# Patient Record
Sex: FEMALE | Race: BLACK OR AFRICAN AMERICAN | NOT HISPANIC OR LATINO
[De-identification: names, ages, dates, MRNs, and addresses within clinical notes are randomized per-mention and may not be internally consistent; named-entity substitution may affect disease eponyms.]

---

## 2017-11-02 PROBLEM — Z00.00 ENCOUNTER FOR PREVENTIVE HEALTH EXAMINATION: Status: ACTIVE | Noted: 2017-11-02

## 2017-11-16 ENCOUNTER — APPOINTMENT (OUTPATIENT)
Dept: ANTEPARTUM | Facility: CLINIC | Age: 22
End: 2017-11-16
Payer: COMMERCIAL

## 2017-11-16 ENCOUNTER — ASOB RESULT (OUTPATIENT)
Age: 22
End: 2017-11-16

## 2017-11-16 PROCEDURE — 76813 OB US NUCHAL MEAS 1 GEST: CPT

## 2017-11-16 PROCEDURE — 76801 OB US < 14 WKS SINGLE FETUS: CPT

## 2017-11-16 PROCEDURE — 36416 COLLJ CAPILLARY BLOOD SPEC: CPT

## 2018-01-02 ENCOUNTER — APPOINTMENT (OUTPATIENT)
Dept: ANTEPARTUM | Facility: CLINIC | Age: 23
End: 2018-01-02

## 2019-01-30 ENCOUNTER — EMERGENCY (EMERGENCY)
Facility: HOSPITAL | Age: 24
LOS: 1 days | Discharge: HOME | End: 2019-01-30
Attending: EMERGENCY MEDICINE
Payer: COMMERCIAL

## 2019-01-30 VITALS
OXYGEN SATURATION: 100 % | DIASTOLIC BLOOD PRESSURE: 82 MMHG | SYSTOLIC BLOOD PRESSURE: 136 MMHG | HEIGHT: 65 IN | WEIGHT: 229.94 LBS | HEART RATE: 73 BPM | RESPIRATION RATE: 15 BRPM | TEMPERATURE: 98 F

## 2019-01-30 VITALS
DIASTOLIC BLOOD PRESSURE: 80 MMHG | OXYGEN SATURATION: 100 % | RESPIRATION RATE: 18 BRPM | TEMPERATURE: 98 F | SYSTOLIC BLOOD PRESSURE: 135 MMHG | HEART RATE: 77 BPM

## 2019-01-30 PROCEDURE — 99284 EMERGENCY DEPT VISIT MOD MDM: CPT

## 2019-01-30 PROCEDURE — 70450 CT HEAD/BRAIN W/O DYE: CPT

## 2019-01-30 PROCEDURE — 99284 EMERGENCY DEPT VISIT MOD MDM: CPT | Mod: 25

## 2019-01-30 PROCEDURE — 70450 CT HEAD/BRAIN W/O DYE: CPT | Mod: 26

## 2019-01-30 NOTE — ED PROVIDER NOTE - OBJECTIVE STATEMENT
23F with no significant Hx, family Hx of aneurysms (paternal grandmother, maternal grandfather, aunt), presents to the ED with complaints of x1 week of acute onset headache, associated with orbital pain, and neck pain. Pt was seen at urgent Care yesterday and was directed to the ED for further work up. Pt has been treating her symptoms with ibuprofen x4 this morning and x2 400mg x6 hours PTA. Denies Hx of CT, blurred vision, weakness, numbness, or any other complaints at this time. States that pain has currently subsided with ibuprofen, with the exception of her orbital pain.

## 2019-01-30 NOTE — ED PROVIDER NOTE - MEDICAL DECISION MAKING DETAILS
23F with complaints of headache. Will obtain CT and reassess. 23F with complaints of headache. No headache currently during physical exam. Will obtain CT and reassess.

## 2019-01-30 NOTE — ED ADULT NURSE NOTE - OBJECTIVE STATEMENT
22 yo F no pmh came to ED c/o ha x 1 week.  Denies CP, back pain, SOB, fevers/chills, numbness, tingling, lightheadedness, dizziness, changes in vision, or neurological symptoms, changes in urinary or bowel habits.  A&Ox4, gross neuro intact, steady gait noted.  VSS.  skin w/d/i.  Safety and comfort maintained. Will continue to monitor.

## 2019-01-30 NOTE — ED ADULT NURSE NOTE - CHPI ED NUR SYMPTOMS NEG
no confusion/no loss of consciousness/no nausea/no numbness/no dizziness/no fever/no vomiting/no weakness/no blurred vision/no change in level of consciousness

## 2019-10-11 ENCOUNTER — EMERGENCY (EMERGENCY)
Facility: HOSPITAL | Age: 24
LOS: 1 days | Discharge: HOME | End: 2019-10-11
Attending: STUDENT IN AN ORGANIZED HEALTH CARE EDUCATION/TRAINING PROGRAM
Payer: COMMERCIAL

## 2019-10-11 VITALS
SYSTOLIC BLOOD PRESSURE: 117 MMHG | OXYGEN SATURATION: 100 % | DIASTOLIC BLOOD PRESSURE: 79 MMHG | RESPIRATION RATE: 18 BRPM | TEMPERATURE: 99 F | HEART RATE: 78 BPM | HEIGHT: 65 IN | WEIGHT: 218.04 LBS

## 2019-10-11 PROCEDURE — 99283 EMERGENCY DEPT VISIT LOW MDM: CPT

## 2019-10-12 PROCEDURE — 81025 URINE PREGNANCY TEST: CPT

## 2019-10-12 PROCEDURE — 99283 EMERGENCY DEPT VISIT LOW MDM: CPT | Mod: 25

## 2019-10-12 PROCEDURE — 96372 THER/PROPH/DIAG INJ SC/IM: CPT

## 2019-10-12 PROCEDURE — 81001 URINALYSIS AUTO W/SCOPE: CPT

## 2019-10-12 RX ORDER — DIAZEPAM 5 MG
1 TABLET ORAL
Qty: 9 | Refills: 0
Start: 2019-10-12 | End: 2019-10-14

## 2019-10-12 RX ORDER — ACETAMINOPHEN 500 MG
975 TABLET ORAL ONCE
Refills: 0 | Status: COMPLETED | OUTPATIENT
Start: 2019-10-12 | End: 2019-10-12

## 2019-10-12 RX ORDER — DIAZEPAM 5 MG
5 TABLET ORAL ONCE
Refills: 0 | Status: DISCONTINUED | OUTPATIENT
Start: 2019-10-12 | End: 2019-10-12

## 2019-10-12 RX ORDER — KETOROLAC TROMETHAMINE 30 MG/ML
30 SYRINGE (ML) INJECTION ONCE
Refills: 0 | Status: DISCONTINUED | OUTPATIENT
Start: 2019-10-12 | End: 2019-10-12

## 2019-10-12 RX ORDER — LIDOCAINE 4 G/100G
1 CREAM TOPICAL ONCE
Refills: 0 | Status: COMPLETED | OUTPATIENT
Start: 2019-10-12 | End: 2019-10-12

## 2019-10-12 RX ADMIN — LIDOCAINE 1 PATCH: 4 CREAM TOPICAL at 00:57

## 2019-10-12 RX ADMIN — Medication 30 MILLIGRAM(S): at 00:57

## 2019-10-12 RX ADMIN — Medication 5 MILLIGRAM(S): at 00:57

## 2019-10-12 RX ADMIN — Medication 975 MILLIGRAM(S): at 00:57

## 2019-10-12 NOTE — ED PROVIDER NOTE - CLINICAL SUMMARY MEDICAL DECISION MAKING FREE TEXT BOX
Shanda Nails): 25 y/o F with history of chronic back pain presents with acute on chronic back pain with associated numbness down L left. No red flags (no bladder incontinence, no bowel incontinence, no fever, no chills) to suggest any cord problems. Suspect pain is all musculoskeletal. Plan for pain control and outpatient spine follow up.

## 2019-10-12 NOTE — ED ADULT NURSE NOTE - OBJECTIVE STATEMENT
24 YOF A&Ox3 with pmh of chronic back pain presents to ED for back pain radiating to left leg. pt rates pain 3/10 to center of lower back. pt denies sob, chest pain, n/v/d, headaches, n/v/d, headaches, numbness/tingling, blurry vision. safety maintained.

## 2019-10-12 NOTE — ED PROVIDER NOTE - NS_ ATTENDINGSCRIBEDETAILS _ED_A_ED_FT
Shanda Nails M.D.: The history, relevant review of systems, past medical and surgical history, medical decision making, and physical examination was documented by the scribe in my presence and I attest to the accuracy of the documentation .

## 2019-10-12 NOTE — ED ADULT NURSE NOTE - NSIMPLEMENTINTERV_GEN_ALL_ED
Implemented All Universal Safety Interventions:  [place] to call system. Call bell, personal items and telephone within reach. Instruct patient to call for assistance. Room bathroom lighting operational. Non-slip footwear when patient is off stretcher. Physically safe environment: no spills, clutter or unnecessary equipment. Stretcher in lowest position, wheels locked, appropriate side rails in place.

## 2019-10-12 NOTE — ED PROVIDER NOTE - PATIENT PORTAL LINK FT
You can access the FollowMyHealth Patient Portal offered by [facility] by registering at the following website: [email or web address]. By joining [facility]’s FollowMyHealth portal, you will also be able to view your health information using other applications (apps) compatible with our system.

## 2019-10-12 NOTE — ED PROVIDER NOTE - OBJECTIVE STATEMENT
23 y/o F with no significant pmHx and psHx of , knee surgery, and hernia repair presents to the ED c/o worsening back pain x1 week. Pt has chronic back pain with tingling down L leg since she was 19 y/o but symptoms have exacerbated. Denies numbness in the groin, bowel incontinence, and urinary incontinence. Pt took 5 dosages of Excedrin 15 hours ago because pain was unbearable from standing at work. Pt has gone to ED several times in the past for similar symptoms where she received muscle relaxers and ibuprofen without any improvement. Pt has seen a chiropractor and consulted her PCP. Denies seeing a spine doctor or neurologist. Received a MRI in the past but does not remember the results.

## 2019-10-12 NOTE — ED ADULT NURSE NOTE - CHPI ED NUR SYMPTOMS NEG
no fatigue/no motor function loss/no bladder dysfunction/no tingling/no difficulty bearing weight/no numbness/no anorexia/no bowel dysfunction/no neck tenderness/no constipation

## 2019-10-12 NOTE — ED PROVIDER NOTE - MUSCULOSKELETAL MINIMAL EXAM
Left lower thoracic and lumbar paraspinal tenderness. Positive straight leg raise on L. No midline/spinal TTP. No flank pain. 2+ pulses.

## 2019-10-13 DIAGNOSIS — Z98.891 HISTORY OF UTERINE SCAR FROM PREVIOUS SURGERY: Chronic | ICD-10-CM

## 2019-10-13 DIAGNOSIS — Z98.890 OTHER SPECIFIED POSTPROCEDURAL STATES: Chronic | ICD-10-CM

## 2019-10-31 ENCOUNTER — APPOINTMENT (OUTPATIENT)
Dept: SPINE | Facility: CLINIC | Age: 24
End: 2019-10-31
Payer: COMMERCIAL

## 2019-10-31 VITALS
BODY MASS INDEX: 36.32 KG/M2 | SYSTOLIC BLOOD PRESSURE: 128 MMHG | HEART RATE: 101 BPM | WEIGHT: 218 LBS | DIASTOLIC BLOOD PRESSURE: 81 MMHG | HEIGHT: 65 IN

## 2019-10-31 DIAGNOSIS — Z78.9 OTHER SPECIFIED HEALTH STATUS: ICD-10-CM

## 2019-10-31 DIAGNOSIS — Z83.3 FAMILY HISTORY OF DIABETES MELLITUS: ICD-10-CM

## 2019-10-31 PROCEDURE — 99203 OFFICE O/P NEW LOW 30 MIN: CPT

## 2019-10-31 RX ORDER — CIDER VINEGAR 300 MG
TABLET ORAL
Refills: 0 | Status: ACTIVE | COMMUNITY

## 2019-10-31 NOTE — ASSESSMENT
[FreeTextEntry1] : MRI of lumbar spine\par Xrays of lumbar region and AP and lateral views\par Scoliosiis xray \par PT

## 2019-10-31 NOTE — HISTORY OF PRESENT ILLNESS
[FreeTextEntry1] : Lumbar radiculopathy  [de-identified] : 24 year old female who presents with a lower back pain for six years that is progressing.  Her pain is 4/10.  the pain radiates into the middle of her legs to the knee and at times goes into the foot and toes..  She has taken oral steroids which was effective for a short period of time.  PT was completed a few years ago. she does not describe any weakness or incontinence. There are no symptoms in the upper extremities. She has not had any imaging studies. She does go to the gym and exercise on regular basis.

## 2019-10-31 NOTE — REASON FOR VISIT
[New Patient Visit] : a new patient visit [Other: _____] : [unfilled] [Referred By: _________] : Patient was referred by REBECCA [FreeTextEntry1] : Lumbar Radiculopathy

## 2019-10-31 NOTE — PHYSICAL EXAM
[Person] : oriented to person [Place] : oriented to place [Time] : oriented to time [Short Term Intact] : short term memory intact [Remote Intact] : remote memory intact [Span Intact] : the attention span was normal [Concentration Intact] : normal concentrating ability [Fluency] : fluency intact [Comprehension] : comprehension intact [Current Events] : adequate knowledge of current events [Past History] : adequate knowledge of personal past history [Vocabulary] : adequate range of vocabulary [Cranial Nerves Optic (II)] : visual acuity intact bilaterally,  pupils equal round and reactive to light [Cranial Nerves Oculomotor (III)] : extraocular motion intact [Cranial Nerves Trigeminal (V)] : facial sensation intact symmetrically [Cranial Nerves Facial (VII)] : face symmetrical [Cranial Nerves Vestibulocochlear (VIII)] : hearing was intact bilaterally [Cranial Nerves Glossopharyngeal (IX)] : tongue and palate midline [Cranial Nerves Accessory (XI - Cranial And Spinal)] : head turning and shoulder shrug symmetric [Cranial Nerves Hypoglossal (XII)] : there was no tongue deviation with protrusion [Motor Tone] : muscle tone was normal in all four extremities [Motor Strength] : muscle strength was normal in all four extremities [No Muscle Atrophy] : normal bulk in all four extremities [Sensation Tactile Decrease] : light touch was intact [Abnormal Walk] : normal gait [Balance] : balance was intact [Past-pointing] : there was no past-pointing [Tremor] : no tremor present [2+] : Ankle jerk left 2+ [Plantar Reflex Right Only] : normal on the right [Plantar Reflex Left Only] : normal on the left [Gomez] : Gomez's sign was not demonstrated [No Tenderness to Palpation] : no spine tenderness on palpation [Full ROM] : full ROM [Straight-Leg Raise Test - Left] : straight leg raise of the left leg was negative [Straight-Leg Raise Test - Right] : straight leg raise  of the right leg was negative [Able to toe walk] : the patient was able to toe walk [Able to heel walk] : the patient was able to heel walk

## 2019-11-19 ENCOUNTER — OTHER (OUTPATIENT)
Age: 24
End: 2019-11-19

## 2019-12-12 ENCOUNTER — APPOINTMENT (OUTPATIENT)
Dept: SPINE | Facility: CLINIC | Age: 24
End: 2019-12-12

## 2019-12-13 ENCOUNTER — APPOINTMENT (OUTPATIENT)
Dept: RADIOLOGY | Facility: CLINIC | Age: 24
End: 2019-12-13
Payer: COMMERCIAL

## 2019-12-13 ENCOUNTER — OUTPATIENT (OUTPATIENT)
Dept: OUTPATIENT SERVICES | Facility: HOSPITAL | Age: 24
LOS: 1 days | End: 2019-12-13
Payer: COMMERCIAL

## 2019-12-13 ENCOUNTER — APPOINTMENT (OUTPATIENT)
Dept: MRI IMAGING | Facility: CLINIC | Age: 24
End: 2019-12-13
Payer: COMMERCIAL

## 2019-12-13 ENCOUNTER — APPOINTMENT (OUTPATIENT)
Dept: RADIOLOGY | Facility: IMAGING CENTER | Age: 24
End: 2019-12-13

## 2019-12-13 DIAGNOSIS — Z98.890 OTHER SPECIFIED POSTPROCEDURAL STATES: Chronic | ICD-10-CM

## 2019-12-13 DIAGNOSIS — M54.5 LOW BACK PAIN: ICD-10-CM

## 2019-12-13 DIAGNOSIS — Z98.891 HISTORY OF UTERINE SCAR FROM PREVIOUS SURGERY: Chronic | ICD-10-CM

## 2019-12-13 PROCEDURE — 72082 X-RAY EXAM ENTIRE SPI 2/3 VW: CPT

## 2019-12-13 PROCEDURE — 72084 X-RAY EXAM ENTIRE SPI 6/> VW: CPT | Mod: 26

## 2019-12-13 PROCEDURE — 72084 X-RAY EXAM ENTIRE SPI 6/> VW: CPT

## 2019-12-13 PROCEDURE — 72148 MRI LUMBAR SPINE W/O DYE: CPT | Mod: 26

## 2019-12-13 PROCEDURE — 72148 MRI LUMBAR SPINE W/O DYE: CPT

## 2019-12-13 PROCEDURE — 72110 X-RAY EXAM L-2 SPINE 4/>VWS: CPT

## 2020-01-09 ENCOUNTER — APPOINTMENT (OUTPATIENT)
Dept: SPINE | Facility: CLINIC | Age: 25
End: 2020-01-09
Payer: COMMERCIAL

## 2020-01-09 VITALS
HEIGHT: 65 IN | BODY MASS INDEX: 36.32 KG/M2 | DIASTOLIC BLOOD PRESSURE: 79 MMHG | WEIGHT: 218 LBS | HEART RATE: 88 BPM | OXYGEN SATURATION: 98 % | SYSTOLIC BLOOD PRESSURE: 110 MMHG | RESPIRATION RATE: 18 BRPM

## 2020-01-09 DIAGNOSIS — Z78.9 OTHER SPECIFIED HEALTH STATUS: ICD-10-CM

## 2020-01-09 DIAGNOSIS — M43.16 SPONDYLOLISTHESIS, LUMBAR REGION: ICD-10-CM

## 2020-01-09 DIAGNOSIS — M54.5 LOW BACK PAIN: ICD-10-CM

## 2020-01-09 PROCEDURE — 99213 OFFICE O/P EST LOW 20 MIN: CPT

## 2020-01-09 NOTE — PHYSICAL EXAM
[Motor Strength] : muscle strength was normal in all four extremities [Sensation Tactile Decrease] : light touch was intact

## 2020-01-31 NOTE — REASON FOR VISIT
[Follow-Up: _____] : a [unfilled] follow-up visit [Other: _____] : [unfilled] [FreeTextEntry1] : 24 year old female with a 6 year history of lumbar radiculopathy of bilateral legs.  She has difficulty walking distances and could not walk a mile on vacation.  She has never had surgery on her lower spine with chronic back pain.  Both legs have tingling and numbness into the foot and all toes bilaterally.  She brings radiographic images for review today.  No urine or bowel incontinence.  Today the pain is mild at 1/10.  Her back pain is significantly affecting her lifestyle. Flexion extension x-rays show significant L4-5 with dynamic instability up to grade 2 spondylolisthesis. There is a transitional lumbosacral junction with a rudintery L5-S1 disc space. There is spondylolysis at L4-5 seen on MRI scan and plain x-ray.

## 2020-01-31 NOTE — DATA REVIEWED
[de-identified] : Scoliosis at [facility] form 12/13/2019, and lumbar xrays from [facility] 12/13/2019 [de-identified] : MRI of lower spine 12/13/2019 from [facility]

## 2020-01-31 NOTE — REVIEW OF SYSTEMS
[Numbness] : numbness [Difficulty Walking] : difficulty walking [Tingling] : tingling [Limping] : limping [Negative] : Heme/Lymph [de-identified] : lower back pain into bilateral legs

## 2020-03-12 ENCOUNTER — FORM ENCOUNTER (OUTPATIENT)
Age: 25
End: 2020-03-12

## 2020-03-13 ENCOUNTER — OUTPATIENT (OUTPATIENT)
Dept: OUTPATIENT SERVICES | Facility: HOSPITAL | Age: 25
LOS: 1 days | End: 2020-03-13
Payer: COMMERCIAL

## 2020-03-13 VITALS
SYSTOLIC BLOOD PRESSURE: 116 MMHG | DIASTOLIC BLOOD PRESSURE: 83 MMHG | TEMPERATURE: 99 F | RESPIRATION RATE: 14 BRPM | HEIGHT: 65 IN | OXYGEN SATURATION: 98 % | WEIGHT: 227.08 LBS | HEART RATE: 92 BPM

## 2020-03-13 DIAGNOSIS — Z01.818 ENCOUNTER FOR OTHER PREPROCEDURAL EXAMINATION: ICD-10-CM

## 2020-03-13 DIAGNOSIS — M54.16 RADICULOPATHY, LUMBAR REGION: ICD-10-CM

## 2020-03-13 DIAGNOSIS — Z98.891 HISTORY OF UTERINE SCAR FROM PREVIOUS SURGERY: Chronic | ICD-10-CM

## 2020-03-13 DIAGNOSIS — M54.5 LOW BACK PAIN: ICD-10-CM

## 2020-03-13 DIAGNOSIS — Z90.89 ACQUIRED ABSENCE OF OTHER ORGANS: Chronic | ICD-10-CM

## 2020-03-13 DIAGNOSIS — Z29.9 ENCOUNTER FOR PROPHYLACTIC MEASURES, UNSPECIFIED: ICD-10-CM

## 2020-03-13 DIAGNOSIS — Z98.890 OTHER SPECIFIED POSTPROCEDURAL STATES: Chronic | ICD-10-CM

## 2020-03-13 DIAGNOSIS — M43.16 SPONDYLOLISTHESIS, LUMBAR REGION: ICD-10-CM

## 2020-03-13 LAB
ANION GAP SERPL CALC-SCNC: 14 MMOL/L — SIGNIFICANT CHANGE UP (ref 5–17)
BUN SERPL-MCNC: 11 MG/DL — SIGNIFICANT CHANGE UP (ref 7–23)
CALCIUM SERPL-MCNC: 9.2 MG/DL — SIGNIFICANT CHANGE UP (ref 8.4–10.5)
CHLORIDE SERPL-SCNC: 105 MMOL/L — SIGNIFICANT CHANGE UP (ref 96–108)
CO2 SERPL-SCNC: 20 MMOL/L — LOW (ref 22–31)
CREAT SERPL-MCNC: 0.64 MG/DL — SIGNIFICANT CHANGE UP (ref 0.5–1.3)
GLUCOSE SERPL-MCNC: 92 MG/DL — SIGNIFICANT CHANGE UP (ref 70–99)
HCT VFR BLD CALC: 36.1 % — SIGNIFICANT CHANGE UP (ref 34.5–45)
HGB BLD-MCNC: 12.2 G/DL — SIGNIFICANT CHANGE UP (ref 11.5–15.5)
MCHC RBC-ENTMCNC: 31 PG — SIGNIFICANT CHANGE UP (ref 27–34)
MCHC RBC-ENTMCNC: 33.8 GM/DL — SIGNIFICANT CHANGE UP (ref 32–36)
MCV RBC AUTO: 91.6 FL — SIGNIFICANT CHANGE UP (ref 80–100)
NRBC # BLD: 0 /100 WBCS — SIGNIFICANT CHANGE UP (ref 0–0)
PLATELET # BLD AUTO: 274 K/UL — SIGNIFICANT CHANGE UP (ref 150–400)
POTASSIUM SERPL-MCNC: 3.4 MMOL/L — LOW (ref 3.5–5.3)
POTASSIUM SERPL-SCNC: 3.4 MMOL/L — LOW (ref 3.5–5.3)
RBC # BLD: 3.94 M/UL — SIGNIFICANT CHANGE UP (ref 3.8–5.2)
RBC # FLD: 12.6 % — SIGNIFICANT CHANGE UP (ref 10.3–14.5)
RH IG SCN BLD-IMP: POSITIVE — SIGNIFICANT CHANGE UP
SODIUM SERPL-SCNC: 139 MMOL/L — SIGNIFICANT CHANGE UP (ref 135–145)
WBC # BLD: 6.54 K/UL — SIGNIFICANT CHANGE UP (ref 3.8–10.5)
WBC # FLD AUTO: 6.54 K/UL — SIGNIFICANT CHANGE UP (ref 3.8–10.5)

## 2020-03-13 PROCEDURE — 86880 COOMBS TEST DIRECT: CPT

## 2020-03-13 PROCEDURE — 86901 BLOOD TYPING SEROLOGIC RH(D): CPT

## 2020-03-13 PROCEDURE — 86850 RBC ANTIBODY SCREEN: CPT

## 2020-03-13 PROCEDURE — 72131 CT LUMBAR SPINE W/O DYE: CPT | Mod: 26

## 2020-03-13 PROCEDURE — 87640 STAPH A DNA AMP PROBE: CPT

## 2020-03-13 PROCEDURE — 80048 BASIC METABOLIC PNL TOTAL CA: CPT

## 2020-03-13 PROCEDURE — 86905 BLOOD TYPING RBC ANTIGENS: CPT

## 2020-03-13 PROCEDURE — 86900 BLOOD TYPING SEROLOGIC ABO: CPT

## 2020-03-13 PROCEDURE — 72131 CT LUMBAR SPINE W/O DYE: CPT

## 2020-03-13 PROCEDURE — 87641 MR-STAPH DNA AMP PROBE: CPT

## 2020-03-13 PROCEDURE — 86870 RBC ANTIBODY IDENTIFICATION: CPT

## 2020-03-13 PROCEDURE — 85027 COMPLETE CBC AUTOMATED: CPT

## 2020-03-13 PROCEDURE — G0463: CPT

## 2020-03-13 RX ORDER — IBUPROFEN 200 MG
6 TABLET ORAL
Qty: 0 | Refills: 0 | DISCHARGE

## 2020-03-13 NOTE — H&P PST ADULT - NSICDXPASTMEDICALHX_GEN_ALL_CORE_FT
PAST MEDICAL HISTORY:  No pertinent past medical history PAST MEDICAL HISTORY:  Lumbar radiculopathy

## 2020-03-13 NOTE — H&P PST ADULT - HISTORY OF PRESENT ILLNESS
23 y/o female with no significant medical history 23 y/o female with no significant medical history, c/o lumbar radiculopathy of bilateral legs, progressively worsening over the past year. Pt reports having difficulty ambulating long distances with intermittent numbness and tingling. Diagnostic imaging revealed mild central canal stenosis, moderate lumbar disc protrusion, severe bilateral foraminal narrowing. Evaluated by Dr. Navarro. Presents to [facility] for a scheduled L3-5 posterior lumbar fusion on 3/24/2020.

## 2020-03-13 NOTE — H&P PST ADULT - NSANTHOSAYNRD_GEN_A_CORE
No. JENIFER screening performed.  STOP BANG Legend: 0-2 = LOW Risk; 3-4 = INTERMEDIATE Risk; 5-8 = HIGH Risk/neck 15"

## 2020-03-13 NOTE — H&P PST ADULT - ACTIVITY
ADLs, limited 2/2 lower back pain and leg weakness ADLs, light housework, able to climb steps slowly, limited 2/2 lower back pain and leg weakness

## 2020-03-13 NOTE — H&P PST ADULT - MARITAL STATUS
I will START or STAY ON the medications listed below when I get home from the hospital:  None
Single

## 2020-03-13 NOTE — H&P PST ADULT - PRIMARY CARE PROVIDER
Jenny Us ([facility]) [phone] Jenny Us ([facility]) [phone], appt 3/10

## 2020-03-13 NOTE — H&P PST ADULT - NSICDXPROBLEM_GEN_ALL_CORE_FT
PROBLEM DIAGNOSES  Problem: Lumbar radiculopathy  Assessment and Plan: Scheduled L3-5 posterior lumbar fusion 3/24/2020  Pre-op instructions given to pt, chlorhexidine soap provided  Lab work sent at PST, including nasal swab    Problem: Need for prophylactic measure  Assessment and Plan: The Caprini score indicates this patient is at risk for a VTE event (score 3-5).  Most surgical patients in this group would benefit from pharmacologic prophylaxis.  The surgical team will determine the balance between VTE risk and bleeding risk

## 2020-03-13 NOTE — H&P PST ADULT - ASSESSMENT
CAPRINI SCORE [CLOT updated 18]    AGE RELATED RISK FACTORS                                                       MOBILITY RELATED FACTORS  [ ] Age 41-60 years                                            (1 Point)                    [ ] Bed rest                                                        (1 Point)  [ ] Age: 61-74 years                                           (2 Points)                  [ ] Plaster cast                                                   (2 Points)  [ ] Age= 75 years                                              (3 Points)                    [ ] Bed bound for more than 72 hours                 (2 Points)    DISEASE RELATED RISK FACTORS                                               GENDER SPECIFIC FACTORS  [ ] Edema in the lower extremities                       (1 Point)              [ ] Pregnancy                                                     (1 Point)  [ ] Varicose veins                                               (1 Point)                     [ ] Post-partum < 6 weeks                                   (1 Point)             [x] BMI > 25 Kg/m2                                            (1 Point)                     [ ] Hormonal therapy  or oral contraception          (1 Point)                 [ ] Sepsis (in the previous month)                        (1 Point)               [ ] History of pregnancy complications                 (1 point)  [ ] Pneumonia or serious lung disease                                               [ ] Unexplained or recurrent                     (1 Point)           (in the previous month)                               (1 Point)  [ ] Abnormal pulmonary function test                     (1 Point)                 SURGERY RELATED RISK FACTORS  [ ] Acute myocardial infarction                              (1 Point)               [ ]  Section                                             (1 Point)  [ ] Congestive heart failure (in the previous month)  (1 Point)      [ ] Minor surgery                                                  (1 Point)   [ ] Inflammatory bowel disease                             (1 Point)               [ ] Arthroscopic surgery                                        (2 Points)  [ ] Central venous access                                      (2 Points)                [x] General surgery lasting more than 45 minutes (2 points)  [ ] Present or previous malignancy                     (2 Points)                [ ] Elective arthroplasty                                         (5 points)    [ ] Stroke (in the previous month)                          (5 Points)                                                                                                                                                           HEMATOLOGY RELATED FACTORS                                                 TRAUMA RELATED RISK FACTORS  [ ] Prior episodes of VTE                                     (3 Points)                [ ] Fracture of the hip, pelvis, or leg                       (5 Points)  [ ] Positive family history for VTE                         (3 Points)             [ ] Acute spinal cord injury (in the previous month)  (5 Points)  [ ] Prothrombin 47952 A                                     (3 Points)               [ ] Paralysis  (less than 1 month)                             (5 Points)  [ ] Factor V Leiden                                             (3 Points)                  [ ] Multiple Trauma within 1 month                        (5 Points)  [ ] Lupus anticoagulants                                     (3 Points)                                                           [ ] Anticardiolipin antibodies                               (3 Points)                                                       [ ] High homocysteine in the blood                      (3 Points)                                             [ ] Other congenital or acquired thrombophilia      (3 Points)                                                [ ] Heparin induced thrombocytopenia                  (3 Points)                                     Total Score [  3   ]

## 2020-03-13 NOTE — H&P PST ADULT - NSICDXPASTSURGICALHX_GEN_ALL_CORE_FT
PAST SURGICAL HISTORY:  H/O hernia repair     H/O knee surgery (L) , ACL & Meniscus tear    H/O:      S/P tonsillectomy

## 2020-03-14 LAB
BLD GP AB SCN SERPL QL: POSITIVE — SIGNIFICANT CHANGE UP
DAT POLY-SP REAG RBC QL: NEGATIVE — SIGNIFICANT CHANGE UP
MRSA PCR RESULT.: SIGNIFICANT CHANGE UP
S AUREUS DNA NOSE QL NAA+PROBE: DETECTED

## 2020-03-14 PROCEDURE — 86077 PHYS BLOOD BANK SERV XMATCH: CPT

## 2020-03-24 ENCOUNTER — APPOINTMENT (OUTPATIENT)
Dept: SPINE | Facility: HOSPITAL | Age: 25
End: 2020-03-24

## 2024-05-23 NOTE — ED ADULT NURSE NOTE - CAS TRG GEN SKIN CONDITION
Warm/Dry Show Applicator Variable?: Yes Detail Level: Detailed Consent: The patient's consent was obtained including but not limited to risks of crusting, scabbing, blistering, scarring, darker or lighter pigmentary change, recurrence, incomplete removal and infection. Number Of Freeze-Thaw Cycles: 2 freeze-thaw cycles Post-Care Instructions: I reviewed with the patient in detail post-care instructions. Patient is to wear sunprotection, and avoid picking at any of the treated lesions. Pt may apply Vaseline to crusted or scabbing areas. Duration Of Freeze Thaw-Cycle (Seconds): 6 Render Note In Bullet Format When Appropriate: No